# Patient Record
Sex: FEMALE | Race: WHITE | NOT HISPANIC OR LATINO
[De-identification: names, ages, dates, MRNs, and addresses within clinical notes are randomized per-mention and may not be internally consistent; named-entity substitution may affect disease eponyms.]

---

## 2017-03-02 ENCOUNTER — RESULT REVIEW (OUTPATIENT)
Age: 42
End: 2017-03-02

## 2018-03-06 ENCOUNTER — RESULT REVIEW (OUTPATIENT)
Age: 43
End: 2018-03-06

## 2018-04-25 ENCOUNTER — TRANSCRIPTION ENCOUNTER (OUTPATIENT)
Age: 43
End: 2018-04-25

## 2019-03-27 ENCOUNTER — RESULT REVIEW (OUTPATIENT)
Age: 44
End: 2019-03-27

## 2019-05-10 ENCOUNTER — APPOINTMENT (OUTPATIENT)
Dept: SURGICAL ONCOLOGY | Facility: CLINIC | Age: 44
End: 2019-05-10
Payer: COMMERCIAL

## 2019-05-10 VITALS
BODY MASS INDEX: 30.9 KG/M2 | WEIGHT: 181 LBS | TEMPERATURE: 98.2 F | RESPIRATION RATE: 17 BRPM | HEIGHT: 64 IN | HEART RATE: 78 BPM | DIASTOLIC BLOOD PRESSURE: 86 MMHG | OXYGEN SATURATION: 98 % | SYSTOLIC BLOOD PRESSURE: 144 MMHG

## 2019-05-10 DIAGNOSIS — E07.9 DISORDER OF THYROID, UNSPECIFIED: ICD-10-CM

## 2019-05-10 DIAGNOSIS — Z87.01 PERSONAL HISTORY OF PNEUMONIA (RECURRENT): ICD-10-CM

## 2019-05-10 DIAGNOSIS — Z80.3 FAMILY HISTORY OF MALIGNANT NEOPLASM OF BREAST: ICD-10-CM

## 2019-05-10 DIAGNOSIS — Z78.9 OTHER SPECIFIED HEALTH STATUS: ICD-10-CM

## 2019-05-10 DIAGNOSIS — N60.19 DIFFUSE CYSTIC MASTOPATHY OF UNSPECIFIED BREAST: ICD-10-CM

## 2019-05-10 PROCEDURE — 99245 OFF/OP CONSLTJ NEW/EST HI 55: CPT

## 2019-05-10 PROCEDURE — 99243 OFF/OP CNSLTJ NEW/EST LOW 30: CPT

## 2019-05-10 NOTE — ASSESSMENT
[FreeTextEntry1] : Left breast lump likely fibroglandular dense breast tissue \par BIRADS-3 breast ultrasound \par Reassured patient that index of suspicion for malignancy is low. \par Lifetime breast cancer risk calculated at 15.9% using Maria Isabel model.\par Will get 6 month follow up left breast ultrasound\par RTO 6 months

## 2019-05-10 NOTE — CONSULT LETTER
[Consult Letter:] : I had the pleasure of evaluating your patient, [unfilled]. [Dear  ___] : Dear  [unfilled], [Sincerely,] : Sincerely, [Please see my note below.] : Please see my note below. [FreeTextEntry3] : Nahum Shelby MD FACS

## 2019-05-10 NOTE — PHYSICAL EXAM
[Normal] : supple, no neck mass and thyroid not enlarged [Normal Supraclavicular Lymph Nodes] : normal supraclavicular lymph nodes [Normal Groin Lymph Nodes] : normal groin lymph nodes [Normal Neck Lymph Nodes] : normal neck lymph nodes  [Normal Axillary Lymph Nodes] : normal axillary lymph nodes [Normal] : oriented to person, place and time, with appropriate affect [de-identified] : No masses or adenopathy bilaterally, mild tender nodularity left 12-2:00 retroareolar location

## 2019-05-10 NOTE — HISTORY OF PRESENT ILLNESS
[de-identified] : 43 y/o female presents for initial consultation for a left breast lump that was noted by Dr. Serrano. \par She also reports intermittent breast pain that may correlate to her menstrual period. \par \par Bilateral MMG 3/28/19: Dense breasts, no evidence of malignancy. BIRADS-2. \par Bilateral breast US 3/28/19: Stable 3 mm fibrocystic nodule seen in right breast. No suspicious finding in left breast. BIRADS-3. \par \par Denies any previous breast biopsies. \par She reports recent weight gain.  \par Denies nipple discharge, nipple retraction/inversion, or skin changes. \par Denies constitutional symptoms. \par Denies fever or chills. \par FMHx: mother (BRCA negative) had breast cancer\par Lifetime breast cancer risk calculated at 15.9% using Maria Isabel model.

## 2019-05-10 NOTE — OB HISTORY
[Menstruating] : The patient is menstruating [Menarche Age ____] : menarche age [unfilled] [Definite ___ (Date)] : the last menstrual period was [unfilled]

## 2019-05-10 NOTE — ADDENDUM
[FreeTextEntry1] : I, Antonieta Lees, acted solely as a scribe for Dr. Nahum Shelby on this date 5/10/19.

## 2020-06-05 ENCOUNTER — RESULT REVIEW (OUTPATIENT)
Age: 45
End: 2020-06-05

## 2021-02-11 ENCOUNTER — APPOINTMENT (OUTPATIENT)
Dept: ORTHOPEDIC SURGERY | Facility: CLINIC | Age: 46
End: 2021-02-11
Payer: COMMERCIAL

## 2021-02-11 VITALS — TEMPERATURE: 97.3 F

## 2021-02-11 VITALS
SYSTOLIC BLOOD PRESSURE: 167 MMHG | DIASTOLIC BLOOD PRESSURE: 84 MMHG | HEART RATE: 96 BPM | HEIGHT: 64 IN | BODY MASS INDEX: 30.9 KG/M2 | WEIGHT: 181 LBS

## 2021-02-11 DIAGNOSIS — M75.41 IMPINGEMENT SYNDROME OF RIGHT SHOULDER: ICD-10-CM

## 2021-02-11 PROCEDURE — 99072 ADDL SUPL MATRL&STAF TM PHE: CPT

## 2021-02-11 PROCEDURE — 73030 X-RAY EXAM OF SHOULDER: CPT | Mod: RT

## 2021-02-11 PROCEDURE — 99203 OFFICE O/P NEW LOW 30 MIN: CPT

## 2021-02-11 RX ORDER — ROSUVASTATIN CALCIUM 10 MG/1
10 TABLET, FILM COATED ORAL
Refills: 0 | Status: ACTIVE | COMMUNITY

## 2021-02-11 RX ORDER — LEVOTHYROXINE SODIUM 137 UG/1
TABLET ORAL
Refills: 0 | Status: ACTIVE | COMMUNITY

## 2021-03-18 ENCOUNTER — APPOINTMENT (OUTPATIENT)
Dept: ORTHOPEDIC SURGERY | Facility: CLINIC | Age: 46
End: 2021-03-18
Payer: COMMERCIAL

## 2021-03-18 DIAGNOSIS — M67.911 UNSPECIFIED DISORDER OF SYNOVIUM AND TENDON, RIGHT SHOULDER: ICD-10-CM

## 2021-03-18 PROCEDURE — 99072 ADDL SUPL MATRL&STAF TM PHE: CPT

## 2021-03-18 PROCEDURE — 99213 OFFICE O/P EST LOW 20 MIN: CPT

## 2021-04-01 ENCOUNTER — TRANSCRIPTION ENCOUNTER (OUTPATIENT)
Age: 46
End: 2021-04-01

## 2021-06-08 ENCOUNTER — RESULT REVIEW (OUTPATIENT)
Age: 46
End: 2021-06-08

## 2021-07-14 ENCOUNTER — NON-APPOINTMENT (OUTPATIENT)
Age: 46
End: 2021-07-14

## 2021-07-30 ENCOUNTER — APPOINTMENT (OUTPATIENT)
Dept: ORTHOPEDIC SURGERY | Facility: CLINIC | Age: 46
End: 2021-07-30
Payer: COMMERCIAL

## 2021-07-30 VITALS
HEART RATE: 85 BPM | HEIGHT: 64 IN | WEIGHT: 181 LBS | BODY MASS INDEX: 30.9 KG/M2 | SYSTOLIC BLOOD PRESSURE: 134 MMHG | DIASTOLIC BLOOD PRESSURE: 88 MMHG

## 2021-07-30 DIAGNOSIS — M25.511 PAIN IN RIGHT SHOULDER: ICD-10-CM

## 2021-07-30 DIAGNOSIS — M75.21 BICIPITAL TENDINITIS, RIGHT SHOULDER: ICD-10-CM

## 2021-07-30 DIAGNOSIS — M67.911 UNSPECIFIED DISORDER OF SYNOVIUM AND TENDON, RIGHT SHOULDER: ICD-10-CM

## 2021-07-30 DIAGNOSIS — G89.29 PAIN IN RIGHT SHOULDER: ICD-10-CM

## 2021-07-30 PROCEDURE — 99213 OFFICE O/P EST LOW 20 MIN: CPT

## 2021-08-18 ENCOUNTER — RESULT REVIEW (OUTPATIENT)
Age: 46
End: 2021-08-18

## 2021-09-10 ENCOUNTER — OUTPATIENT (OUTPATIENT)
Dept: OUTPATIENT SERVICES | Facility: HOSPITAL | Age: 46
LOS: 1 days | End: 2021-09-10
Payer: COMMERCIAL

## 2021-09-10 VITALS
HEART RATE: 77 BPM | OXYGEN SATURATION: 98 % | HEIGHT: 64 IN | WEIGHT: 195.11 LBS | RESPIRATION RATE: 16 BRPM | DIASTOLIC BLOOD PRESSURE: 78 MMHG | TEMPERATURE: 98 F | SYSTOLIC BLOOD PRESSURE: 128 MMHG

## 2021-09-10 DIAGNOSIS — N84.0 POLYP OF CORPUS UTERI: ICD-10-CM

## 2021-09-10 DIAGNOSIS — I10 ESSENTIAL (PRIMARY) HYPERTENSION: ICD-10-CM

## 2021-09-10 DIAGNOSIS — Z98.891 HISTORY OF UTERINE SCAR FROM PREVIOUS SURGERY: Chronic | ICD-10-CM

## 2021-09-10 DIAGNOSIS — Z01.818 ENCOUNTER FOR OTHER PREPROCEDURAL EXAMINATION: ICD-10-CM

## 2021-09-10 LAB
ANION GAP SERPL CALC-SCNC: 14 MMOL/L — SIGNIFICANT CHANGE UP (ref 5–17)
BLD GP AB SCN SERPL QL: NEGATIVE — SIGNIFICANT CHANGE UP
BUN SERPL-MCNC: 12 MG/DL — SIGNIFICANT CHANGE UP (ref 7–23)
CALCIUM SERPL-MCNC: 9.5 MG/DL — SIGNIFICANT CHANGE UP (ref 8.4–10.5)
CHLORIDE SERPL-SCNC: 103 MMOL/L — SIGNIFICANT CHANGE UP (ref 96–108)
CO2 SERPL-SCNC: 21 MMOL/L — LOW (ref 22–31)
CREAT SERPL-MCNC: 0.64 MG/DL — SIGNIFICANT CHANGE UP (ref 0.5–1.3)
GLUCOSE SERPL-MCNC: 110 MG/DL — HIGH (ref 70–99)
HCT VFR BLD CALC: 41.1 % — SIGNIFICANT CHANGE UP (ref 34.5–45)
HGB BLD-MCNC: 13.5 G/DL — SIGNIFICANT CHANGE UP (ref 11.5–15.5)
MCHC RBC-ENTMCNC: 28.5 PG — SIGNIFICANT CHANGE UP (ref 27–34)
MCHC RBC-ENTMCNC: 32.8 GM/DL — SIGNIFICANT CHANGE UP (ref 32–36)
MCV RBC AUTO: 86.7 FL — SIGNIFICANT CHANGE UP (ref 80–100)
NRBC # BLD: 0 /100 WBCS — SIGNIFICANT CHANGE UP (ref 0–0)
PLATELET # BLD AUTO: 339 K/UL — SIGNIFICANT CHANGE UP (ref 150–400)
POTASSIUM SERPL-MCNC: 3.4 MMOL/L — LOW (ref 3.5–5.3)
POTASSIUM SERPL-SCNC: 3.4 MMOL/L — LOW (ref 3.5–5.3)
RBC # BLD: 4.74 M/UL — SIGNIFICANT CHANGE UP (ref 3.8–5.2)
RBC # FLD: 12.9 % — SIGNIFICANT CHANGE UP (ref 10.3–14.5)
RH IG SCN BLD-IMP: POSITIVE — SIGNIFICANT CHANGE UP
SODIUM SERPL-SCNC: 138 MMOL/L — SIGNIFICANT CHANGE UP (ref 135–145)
WBC # BLD: 9.61 K/UL — SIGNIFICANT CHANGE UP (ref 3.8–10.5)
WBC # FLD AUTO: 9.61 K/UL — SIGNIFICANT CHANGE UP (ref 3.8–10.5)

## 2021-09-10 PROCEDURE — 86900 BLOOD TYPING SEROLOGIC ABO: CPT

## 2021-09-10 PROCEDURE — G0463: CPT

## 2021-09-10 PROCEDURE — 80048 BASIC METABOLIC PNL TOTAL CA: CPT

## 2021-09-10 PROCEDURE — 86901 BLOOD TYPING SEROLOGIC RH(D): CPT

## 2021-09-10 PROCEDURE — 86850 RBC ANTIBODY SCREEN: CPT

## 2021-09-10 PROCEDURE — 85027 COMPLETE CBC AUTOMATED: CPT

## 2021-09-10 RX ORDER — SODIUM CHLORIDE 9 MG/ML
3 INJECTION INTRAMUSCULAR; INTRAVENOUS; SUBCUTANEOUS EVERY 8 HOURS
Refills: 0 | Status: DISCONTINUED | OUTPATIENT
Start: 2021-09-23 | End: 2021-10-07

## 2021-09-10 RX ORDER — LIDOCAINE HCL 20 MG/ML
0.2 VIAL (ML) INJECTION ONCE
Refills: 0 | Status: DISCONTINUED | OUTPATIENT
Start: 2021-09-23 | End: 2021-10-07

## 2021-09-10 NOTE — H&P PST ADULT - NS HIV RISK FACTOR
Diabetic screening  Has been a diabetic for 8 years   Takes metformin regularly as scheduled.  A1c is 7.2  Checks blood sugar but not regularly.   Following diet no concerns at this time.   No

## 2021-09-10 NOTE — H&P PST ADULT - HISTORY OF PRESENT ILLNESS
47 y/o female with h/o HTN, HLD, seasonal Asthma c/o menorrhagia with irregular cycles > 6 month, imaging studies done and found to have uterine polyps. Today she presents to PST for scheduled D&C Diagnostic Hysteroscopy Possible Polypectomy Possible Myosure on 9/23/21. Denies any palpitations, SOB, N/V, fever or chills.    ***COVID swab scheduled for 9/20/21***

## 2021-09-10 NOTE — H&P PST ADULT - ATTENDING COMMENTS
Patient presented with complaints of irregular periods, occasional dysmenorrhea, and occasional menorrhagia. An ultrasound and then SHG were performed revealing a possible polypoid growth in the endometrial cavity.  She presents for definitive treatment with D&C hysteroscopy possible polypectomy possible myosure.

## 2021-09-10 NOTE — H&P PST ADULT - NSICDXPASTMEDICALHX_GEN_ALL_CORE_FT
PAST MEDICAL HISTORY:  Disorder of right rotator cuff     History of fibrocystic disease of breast     HLD (hyperlipidemia)     Hypothyroid     Labral tear of shoulder, right, initial encounter     Menorrhagia with irregular cycle     Seasonal asthma     Thyroid nodule     Uterine polyp

## 2021-09-10 NOTE — H&P PST ADULT - PROBLEM SELECTOR PLAN 1
D&C Diagnostic Hysteroscopy Possible Polypectomy Possible Myosure on 9/23/21.  Pre-op education provided - all questions answered. Pt verbalized understanding

## 2021-09-20 PROBLEM — S43.431A SUPERIOR GLENOID LABRUM LESION OF RIGHT SHOULDER, INITIAL ENCOUNTER: Chronic | Status: ACTIVE | Noted: 2021-09-10

## 2021-09-20 PROBLEM — E04.1 NONTOXIC SINGLE THYROID NODULE: Chronic | Status: ACTIVE | Noted: 2021-09-10

## 2021-09-20 PROBLEM — M67.911 UNSPECIFIED DISORDER OF SYNOVIUM AND TENDON, RIGHT SHOULDER: Chronic | Status: ACTIVE | Noted: 2021-09-10

## 2021-09-20 PROBLEM — E78.5 HYPERLIPIDEMIA, UNSPECIFIED: Chronic | Status: ACTIVE | Noted: 2021-09-10

## 2021-09-20 PROBLEM — E03.9 HYPOTHYROIDISM, UNSPECIFIED: Chronic | Status: ACTIVE | Noted: 2021-09-10

## 2021-09-20 PROBLEM — N92.1 EXCESSIVE AND FREQUENT MENSTRUATION WITH IRREGULAR CYCLE: Chronic | Status: ACTIVE | Noted: 2021-09-10

## 2021-09-20 PROBLEM — N84.0 POLYP OF CORPUS UTERI: Chronic | Status: ACTIVE | Noted: 2021-09-10

## 2021-09-20 PROBLEM — J45.998 OTHER ASTHMA: Chronic | Status: ACTIVE | Noted: 2021-09-10

## 2021-09-20 PROBLEM — Z87.898 PERSONAL HISTORY OF OTHER SPECIFIED CONDITIONS: Chronic | Status: ACTIVE | Noted: 2021-09-10

## 2021-09-21 ENCOUNTER — OUTPATIENT (OUTPATIENT)
Dept: OUTPATIENT SERVICES | Facility: HOSPITAL | Age: 46
LOS: 1 days | End: 2021-09-21
Payer: COMMERCIAL

## 2021-09-21 DIAGNOSIS — Z11.52 ENCOUNTER FOR SCREENING FOR COVID-19: ICD-10-CM

## 2021-09-21 DIAGNOSIS — Z98.891 HISTORY OF UTERINE SCAR FROM PREVIOUS SURGERY: Chronic | ICD-10-CM

## 2021-09-21 LAB — SARS-COV-2 RNA SPEC QL NAA+PROBE: SIGNIFICANT CHANGE UP

## 2021-09-21 PROCEDURE — U0003: CPT

## 2021-09-21 PROCEDURE — C9803: CPT

## 2021-09-21 PROCEDURE — U0005: CPT

## 2021-09-22 ENCOUNTER — TRANSCRIPTION ENCOUNTER (OUTPATIENT)
Age: 46
End: 2021-09-22

## 2021-09-23 ENCOUNTER — OUTPATIENT (OUTPATIENT)
Dept: OUTPATIENT SERVICES | Facility: HOSPITAL | Age: 46
LOS: 1 days | End: 2021-09-23
Payer: COMMERCIAL

## 2021-09-23 ENCOUNTER — RESULT REVIEW (OUTPATIENT)
Age: 46
End: 2021-09-23

## 2021-09-23 VITALS
RESPIRATION RATE: 16 BRPM | HEIGHT: 64 IN | DIASTOLIC BLOOD PRESSURE: 78 MMHG | OXYGEN SATURATION: 98 % | TEMPERATURE: 98 F | WEIGHT: 195.11 LBS | HEART RATE: 86 BPM | SYSTOLIC BLOOD PRESSURE: 129 MMHG

## 2021-09-23 VITALS
DIASTOLIC BLOOD PRESSURE: 78 MMHG | SYSTOLIC BLOOD PRESSURE: 128 MMHG | HEART RATE: 80 BPM | TEMPERATURE: 98 F | RESPIRATION RATE: 16 BRPM | OXYGEN SATURATION: 100 %

## 2021-09-23 DIAGNOSIS — Z98.891 HISTORY OF UTERINE SCAR FROM PREVIOUS SURGERY: Chronic | ICD-10-CM

## 2021-09-23 DIAGNOSIS — N84.0 POLYP OF CORPUS UTERI: ICD-10-CM

## 2021-09-23 LAB — RH IG SCN BLD-IMP: POSITIVE — SIGNIFICANT CHANGE UP

## 2021-09-23 PROCEDURE — 88305 TISSUE EXAM BY PATHOLOGIST: CPT | Mod: 26

## 2021-09-23 PROCEDURE — 88305 TISSUE EXAM BY PATHOLOGIST: CPT

## 2021-09-23 PROCEDURE — 58558 HYSTEROSCOPY BIOPSY: CPT

## 2021-09-23 RX ORDER — FENTANYL CITRATE 50 UG/ML
25 INJECTION INTRAVENOUS
Refills: 0 | Status: DISCONTINUED | OUTPATIENT
Start: 2021-09-23 | End: 2021-09-23

## 2021-09-23 RX ORDER — OXYCODONE HYDROCHLORIDE 5 MG/1
5 TABLET ORAL ONCE
Refills: 0 | Status: DISCONTINUED | OUTPATIENT
Start: 2021-09-23 | End: 2021-09-23

## 2021-09-23 RX ORDER — ONDANSETRON 8 MG/1
4 TABLET, FILM COATED ORAL ONCE
Refills: 0 | Status: DISCONTINUED | OUTPATIENT
Start: 2021-09-23 | End: 2021-10-07

## 2021-09-23 RX ADMIN — SODIUM CHLORIDE 3 MILLILITER(S): 9 INJECTION INTRAMUSCULAR; INTRAVENOUS; SUBCUTANEOUS at 06:32

## 2021-09-23 NOTE — BRIEF OPERATIVE NOTE - NSICDXBRIEFPOSTOP_GEN_ALL_CORE_FT
POST-OP DIAGNOSIS:  Abnormal uterine bleeding (AUB) 23-Sep-2021 07:52:10  Angela Serrano  Endometrial polyp 23-Sep-2021 07:52:18  Angela Serrano

## 2021-09-23 NOTE — BRIEF OPERATIVE NOTE - NSICDXBRIEFPROCEDURE_GEN_ALL_CORE_FT
PROCEDURES:  Hysteroscopy with dilation and curettage of uterus 23-Sep-2021 07:51:35  Angela Serrano  Uterine polypectomy 23-Sep-2021 07:51:43  Angela Serrano

## 2021-09-23 NOTE — ASU DISCHARGE PLAN (ADULT/PEDIATRIC) - CARE PROVIDER_API CALL
Angela Serrano)  Obstetrics and Gynecology  [address], [address]  [place] [address]  Phone: [phone]  Fax: [phone]  Follow Up Time:

## 2021-09-23 NOTE — PRE-ANESTHESIA EVALUATION ADULT - NSDENTALSD_ENT_ALL_CORE
Anesthesia Evaluation     Patient summary reviewed and Nursing notes reviewed   history of anesthetic complications: PONV  NPO Solid Status: > 8 hours  NPO Liquid Status: > 8 hours           Airway   Mallampati: II  TM distance: >3 FB  Neck ROM: full  No difficulty expected  Dental      Pulmonary - normal exam   Cardiovascular - negative cardio ROS and normal exam        Neuro/Psych  (+) headaches,     GI/Hepatic/Renal/Endo      Musculoskeletal     (+) neck pain,   Abdominal    Substance History      OB/GYN          Other   arthritis,                      Anesthesia Plan    ASA 2     spinal     intravenous induction     Anesthetic plan, all risks, benefits, and alternatives have been provided, discussed and informed consent has been obtained with: patient.    Plan discussed with CRNA.      
appears normal and intact

## 2021-09-23 NOTE — ASU DISCHARGE PLAN (ADULT/PEDIATRIC) - ACTIVITY LEVEL
No excercise/No heavy lifting/No sports/gym/Nothing per vagina/No tub baths/No douching/No tampons/No intercourse

## 2021-09-28 LAB — SURGICAL PATHOLOGY STUDY: SIGNIFICANT CHANGE UP

## 2021-11-23 ENCOUNTER — TRANSCRIPTION ENCOUNTER (OUTPATIENT)
Age: 46
End: 2021-11-23

## 2021-11-26 ENCOUNTER — OUTPATIENT (OUTPATIENT)
Dept: OUTPATIENT SERVICES | Facility: HOSPITAL | Age: 46
LOS: 1 days | End: 2021-11-26
Payer: COMMERCIAL

## 2021-11-26 VITALS
HEIGHT: 64 IN | HEART RATE: 94 BPM | RESPIRATION RATE: 14 BRPM | DIASTOLIC BLOOD PRESSURE: 92 MMHG | TEMPERATURE: 98 F | OXYGEN SATURATION: 97 % | SYSTOLIC BLOOD PRESSURE: 127 MMHG | WEIGHT: 195.11 LBS

## 2021-11-26 DIAGNOSIS — M75.01 ADHESIVE CAPSULITIS OF RIGHT SHOULDER: ICD-10-CM

## 2021-11-26 DIAGNOSIS — Z90.89 ACQUIRED ABSENCE OF OTHER ORGANS: Chronic | ICD-10-CM

## 2021-11-26 DIAGNOSIS — Z98.891 HISTORY OF UTERINE SCAR FROM PREVIOUS SURGERY: Chronic | ICD-10-CM

## 2021-11-26 DIAGNOSIS — Z98.890 OTHER SPECIFIED POSTPROCEDURAL STATES: Chronic | ICD-10-CM

## 2021-11-26 DIAGNOSIS — Z91.89 OTHER SPECIFIED PERSONAL RISK FACTORS, NOT ELSEWHERE CLASSIFIED: ICD-10-CM

## 2021-11-26 DIAGNOSIS — E03.9 HYPOTHYROIDISM, UNSPECIFIED: ICD-10-CM

## 2021-11-26 DIAGNOSIS — I10 ESSENTIAL (PRIMARY) HYPERTENSION: ICD-10-CM

## 2021-11-26 LAB
ANION GAP SERPL CALC-SCNC: 14 MMOL/L — SIGNIFICANT CHANGE UP (ref 7–14)
BUN SERPL-MCNC: 16 MG/DL — SIGNIFICANT CHANGE UP (ref 7–23)
CALCIUM SERPL-MCNC: 9.5 MG/DL — SIGNIFICANT CHANGE UP (ref 8.4–10.5)
CHLORIDE SERPL-SCNC: 102 MMOL/L — SIGNIFICANT CHANGE UP (ref 98–107)
CO2 SERPL-SCNC: 23 MMOL/L — SIGNIFICANT CHANGE UP (ref 22–31)
CREAT SERPL-MCNC: 0.52 MG/DL — SIGNIFICANT CHANGE UP (ref 0.5–1.3)
GLUCOSE SERPL-MCNC: 87 MG/DL — SIGNIFICANT CHANGE UP (ref 70–99)
HCG UR QL: NEGATIVE — SIGNIFICANT CHANGE UP
HCT VFR BLD CALC: 43.9 % — SIGNIFICANT CHANGE UP (ref 34.5–45)
HGB BLD-MCNC: 13.7 G/DL — SIGNIFICANT CHANGE UP (ref 11.5–15.5)
MCHC RBC-ENTMCNC: 28.7 PG — SIGNIFICANT CHANGE UP (ref 27–34)
MCHC RBC-ENTMCNC: 31.2 GM/DL — LOW (ref 32–36)
MCV RBC AUTO: 91.8 FL — SIGNIFICANT CHANGE UP (ref 80–100)
NRBC # BLD: 0 /100 WBCS — SIGNIFICANT CHANGE UP
NRBC # FLD: 0 K/UL — SIGNIFICANT CHANGE UP
PLATELET # BLD AUTO: 382 K/UL — SIGNIFICANT CHANGE UP (ref 150–400)
POTASSIUM SERPL-MCNC: 3.5 MMOL/L — SIGNIFICANT CHANGE UP (ref 3.5–5.3)
POTASSIUM SERPL-SCNC: 3.5 MMOL/L — SIGNIFICANT CHANGE UP (ref 3.5–5.3)
RBC # BLD: 4.78 M/UL — SIGNIFICANT CHANGE UP (ref 3.8–5.2)
RBC # FLD: 12.9 % — SIGNIFICANT CHANGE UP (ref 10.3–14.5)
SODIUM SERPL-SCNC: 139 MMOL/L — SIGNIFICANT CHANGE UP (ref 135–145)
WBC # BLD: 10.46 K/UL — SIGNIFICANT CHANGE UP (ref 3.8–10.5)
WBC # FLD AUTO: 10.46 K/UL — SIGNIFICANT CHANGE UP (ref 3.8–10.5)

## 2021-11-26 PROCEDURE — 93010 ELECTROCARDIOGRAM REPORT: CPT

## 2021-11-26 RX ORDER — OMEGA-3 ACID ETHYL ESTERS 1 G
1 CAPSULE ORAL
Qty: 0 | Refills: 0 | DISCHARGE

## 2021-11-26 NOTE — H&P PST ADULT - NEUROLOGICAL DETAILS
alert and oriented x 3/responds to verbal commands/no spontaneous movement/normal strength alert and oriented x 3/responds to verbal commands

## 2021-11-26 NOTE — H&P PST ADULT - HISTORY OF PRESENT ILLNESS
45 y/o female with h/o HTN, HLD, seasonal Asthma presents with preop dx adhesive capsulitis of right shoulder scheduled for right shoulder arthroscopy debridement, lysis of adhesions manipulation injection with exparel. Patient reports injuring her arm 3/2020 while lifting sofa, has tried injections, P.T. with no improvement, imaging noted labrum tear, adhesive capsulitis, and developed frozen shoulder. Patient has limited ROM moving right arm behind back

## 2021-11-26 NOTE — H&P PST ADULT - NSICDXPASTSURGICALHX_GEN_ALL_CORE_FT
PAST SURGICAL HISTORY:  H/O:  x2    S/P dilation and curettage     S/P tendon repair     S/P tonsillectomy

## 2021-11-26 NOTE — H&P PST ADULT - NSICDXPASTMEDICALHX_GEN_ALL_CORE_FT
PAST MEDICAL HISTORY:  Adhesive capsulitis of right shoulder     Disorder of right rotator cuff     History of fibrocystic disease of breast     HLD (hyperlipidemia)     HTN (hypertension)     Hypothyroid     Labral tear of shoulder, right, initial encounter     Menorrhagia with irregular cycle     Seasonal asthma     Thyroid nodule     Uterine polyp      PAST MEDICAL HISTORY:  Adhesive capsulitis of right shoulder     Asthma     Disorder of right rotator cuff     History of fibrocystic disease of breast     HLD (hyperlipidemia)     HTN (hypertension)     Hypothyroid     Labral tear of shoulder, right, initial encounter     Menorrhagia with irregular cycle     Seasonal asthma     Thyroid nodule     Uterine polyp

## 2021-11-26 NOTE — H&P PST ADULT - PROBLEM SELECTOR PLAN 3
Assessment and Plan: Patient instructed to take amlodipine on day of procedure, verbalized understanding.

## 2021-11-26 NOTE — H&P PST ADULT - MUSCULOSKELETAL COMMENTS
right shoulder pain h/o Disorder of right rotator cuff right shoulder pain preop dx adhesive capsulitis of right shoulder

## 2021-11-26 NOTE — H&P PST ADULT - MUSCULOSKELETAL
detailed exam right shoulder/no joint swelling/no joint erythema/decreased ROM due to pain details… right shoulder/no joint swelling/no joint erythema/decreased ROM due to pain/diminished strength

## 2021-11-26 NOTE — H&P PST ADULT - PROBLEM SELECTOR PLAN 2
continue on antihypertensive medications Problem: Hypothyroidism  Plan: Instructed to take levothyroxine as prescribed, verbalized understanding

## 2021-11-26 NOTE — H&P PST ADULT - PROBLEM SELECTOR PLAN 1
D&C Diagnostic Hysteroscopy Possible Polypectomy Possible Myosure on 9/23/21.  Pre-op education provided - all questions answered. Pt verbalized understanding Assessment and Plan: Patient scheduled for surgery on 12/14/21  Patient provided with verbal and written presurgical instructions; verbalized understanding  with teach back.    Patient provided with famotidine for GI prophylaxis; verbalized understanding.    Patient provided with Chlorhexidine wash, verbal and written instructions reviewed. Patient demonstrated understanding with teach back.   Patient instructed to call surgeon to schedule COVID appointment     Echo requested    Problem: Pre-menopausal   Urine specimen cup provided     Patient instructed to stop tumeric and fishoil on 12/6/21

## 2021-11-26 NOTE — H&P PST ADULT - RS GEN PE MLT RESP DETAILS PC
breath sounds equal/good air movement/respirations non-labored/clear to auscultation bilaterally/no rales/no wheezes breath sounds equal/good air movement/respirations non-labored/clear to auscultation bilaterally/no rales/no rhonchi/no wheezes

## 2021-11-26 NOTE — H&P PST ADULT - OTHER CARE PROVIDERS
[phone] [phone] cardiologist

## 2021-11-26 NOTE — H&P PST ADULT - NSICDXFAMILYHX_GEN_ALL_CORE_FT
FAMILY HISTORY:  Father  Still living? Yes, Estimated age: Age Unknown  Family history of DVT, Age at diagnosis: Age Unknown  FH: heart disease, Age at diagnosis: Age Unknown    Mother  Still living? Yes, Estimated age: Age Unknown  FH: breast cancer, Age at diagnosis: Age Unknown

## 2021-11-26 NOTE — H&P PST ADULT - MS GEN HX ROS MEA POS PC
joint pain/muscle weakness/stiffness Dx  adhesive capsulitis of right shoulder/joint pain/muscle weakness/stiffness Dx  adhesive capsulitis of right shoulder/joint pain/muscle weakness/stiffness/arm pain R

## 2021-12-13 ENCOUNTER — TRANSCRIPTION ENCOUNTER (OUTPATIENT)
Age: 46
End: 2021-12-13

## 2021-12-13 VITALS
HEART RATE: 89 BPM | HEIGHT: 64 IN | OXYGEN SATURATION: 99 % | DIASTOLIC BLOOD PRESSURE: 67 MMHG | SYSTOLIC BLOOD PRESSURE: 156 MMHG | TEMPERATURE: 98 F | RESPIRATION RATE: 16 BRPM | WEIGHT: 195.11 LBS

## 2021-12-13 NOTE — ASU PREOPERATIVE ASSESSMENT, ADULT ([facility] ONLY) - FALL HARM RISK - UNIVERSAL INTERVENTIONS
Bed in lowest position, wheels locked, appropriate side rails in place/Call bell, personal items and telephone in reach/Instruct patient to call for assistance before getting out of bed or chair/Non-slip footwear when patient is out of bed/[place] to call system/Physically safe environment - no spills, clutter or unnecessary equipment/Purposeful Proactive Rounding/Room/bathroom lighting operational, light cord in reach

## 2021-12-14 ENCOUNTER — RESULT REVIEW (OUTPATIENT)
Age: 46
End: 2021-12-14

## 2021-12-14 ENCOUNTER — OUTPATIENT (OUTPATIENT)
Dept: OUTPATIENT SERVICES | Facility: HOSPITAL | Age: 46
LOS: 1 days | Discharge: HOME | End: 2021-12-14
Payer: COMMERCIAL

## 2021-12-14 VITALS
OXYGEN SATURATION: 99 % | TEMPERATURE: 98 F | DIASTOLIC BLOOD PRESSURE: 67 MMHG | SYSTOLIC BLOOD PRESSURE: 117 MMHG | HEART RATE: 93 BPM

## 2021-12-14 DIAGNOSIS — Z90.89 ACQUIRED ABSENCE OF OTHER ORGANS: Chronic | ICD-10-CM

## 2021-12-14 DIAGNOSIS — Z98.891 HISTORY OF UTERINE SCAR FROM PREVIOUS SURGERY: Chronic | ICD-10-CM

## 2021-12-14 DIAGNOSIS — Z98.890 OTHER SPECIFIED POSTPROCEDURAL STATES: Chronic | ICD-10-CM

## 2021-12-14 DIAGNOSIS — M75.01 ADHESIVE CAPSULITIS OF RIGHT SHOULDER: ICD-10-CM

## 2021-12-14 PROCEDURE — 88304 TISSUE EXAM BY PATHOLOGIST: CPT | Mod: 26

## 2021-12-14 NOTE — BRIEF OPERATIVE NOTE - NSICDXBRIEFPROCEDURE_GEN_ALL_CORE_FT
PROCEDURES:  Arthroscopy of shoulder with capsular release 14-Dec-2021 10:11:08  Efren Reagan  Arthroscopic lysis and resection of adhesions of shoulder with manipulation 14-Dec-2021 10:12:53  Efren Reagan  Injection of shoulder 14-Dec-2021 10:13:35  Efren Reagan

## 2021-12-14 NOTE — ASU DISCHARGE PLAN (ADULT/PEDIATRIC) - ASU DC SPECIAL INSTRUCTIONSFT
WOUND CARE:  Your dressing will be removed in the office. Keep dressing clean and dry.  BATHING: You may shower and/or sponge bathe. You may use warm soapy water in the shower and rinse, pat dry.  ACTIVITY: You are non-weight bearing of the right shoulder until you follow up. No heavy lifting or straining. Otherwise, you may return to your usual level of physical activity. If you are taking narcotic pain medication DO NOT drive a car, operate machinery or make important decisions.  DIET: Return to your usual diet.  NOTIFY YOUR SURGEON IF YOU HAVE: any bleeding that does not stop, any pus draining from your wound(s), any fever (over 101.4 F) persistent nausea/vomiting, if you are unable to urinate, or if your pain is not controlled on your discharge pain medications.    Please follow up with your surgeon, Dr. Ash in one week. WOUND CARE:  Your dressing will be removed in the office. Keep dressing clean and dry.  BATHING: You may shower and/or sponge bathe. You may use warm soapy water in the shower and rinse, pat dry.  ACTIVITY: You are weight bearing as tolerated through the right shoulder. No heavy lifting or straining. Otherwise, you may return to your usual level of physical activity. If you are taking narcotic pain medication DO NOT drive a car, operate machinery or make important decisions.  DIET: Return to your usual diet.  NOTIFY YOUR SURGEON IF YOU HAVE: any bleeding that does not stop, any pus draining from your wound(s), any fever (over 101.4 F) persistent nausea/vomiting, if you are unable to urinate, or if your pain is not controlled on your discharge pain medications.    Please follow up with your surgeon, Dr. Ash in one week.

## 2021-12-14 NOTE — ASU DISCHARGE PLAN (ADULT/PEDIATRIC) - CALL YOUR DOCTOR IF YOU HAVE ANY OF THE FOLLOWING:
Pain not relieved by Medications/Fever greater than (need to indicate Fahrenheit or Celsius)/Numbness, tingling, color or temperature change to extremity Bleeding that does not stop/Pain not relieved by Medications/Fever greater than (need to indicate Fahrenheit or Celsius)/Numbness, tingling, color or temperature change to extremity/Nausea and vomiting that does not stop

## 2021-12-14 NOTE — ASU DISCHARGE PLAN (ADULT/PEDIATRIC) - PROCEDURE
Last seen: 2/13/19  Follow up appointment: 3/20/19  Last filled: adderall XR 30 mg and adderall 20 mg  2/13/19  This is a Dr Sb Braxton patient    Okay to refill? Please advise. Right shoulder arthroscopy, capsular release, lysis of adhesions, manipulation under anesthesia, and cortisone injection Right shoulder arthroscopy, debridement, lysis of adhesions, manipulation under anesthesia, and exparel injection

## 2021-12-14 NOTE — BRIEF OPERATIVE NOTE - OPERATION/FINDINGS
inferior labrum tear, thickening of shoulder capsule, global adhesions in shoulder capsule, mild inflammation of biceps tendon

## 2021-12-14 NOTE — ASU DISCHARGE PLAN (ADULT/PEDIATRIC) - FOLLOW UP APPOINTMENTS
[facility], [facility] ([facility])

## 2021-12-14 NOTE — ASU DISCHARGE PLAN (ADULT/PEDIATRIC) - CARE PROVIDER_API CALL
Judd Ash)  Orthopaedic Surgery  [address] [place] [id number]  Phone: [phone]  Fax: [phone]  Follow Up Time: 1 week

## 2021-12-28 LAB — SURGICAL PATHOLOGY STUDY: SIGNIFICANT CHANGE UP

## 2022-04-19 PROBLEM — M75.01 ADHESIVE CAPSULITIS OF RIGHT SHOULDER: Chronic | Status: ACTIVE | Noted: 2021-11-26

## 2022-04-19 PROBLEM — I10 ESSENTIAL (PRIMARY) HYPERTENSION: Chronic | Status: ACTIVE | Noted: 2021-11-26

## 2022-04-19 PROBLEM — J45.909 UNSPECIFIED ASTHMA, UNCOMPLICATED: Chronic | Status: ACTIVE | Noted: 2021-11-26

## 2022-04-29 ENCOUNTER — APPOINTMENT (OUTPATIENT)
Dept: ORTHOPEDIC SURGERY | Facility: CLINIC | Age: 47
End: 2022-04-29
Payer: COMMERCIAL

## 2022-04-29 VITALS — BODY MASS INDEX: 32.44 KG/M2 | HEIGHT: 64 IN | WEIGHT: 190 LBS

## 2022-04-29 DIAGNOSIS — G57.13 MERALGIA PARESTHETICA, BILATERAL LOWER LIMBS: ICD-10-CM

## 2022-04-29 DIAGNOSIS — E78.00 PURE HYPERCHOLESTEROLEMIA, UNSPECIFIED: ICD-10-CM

## 2022-04-29 DIAGNOSIS — M54.16 RADICULOPATHY, LUMBAR REGION: ICD-10-CM

## 2022-04-29 DIAGNOSIS — I10 ESSENTIAL (PRIMARY) HYPERTENSION: ICD-10-CM

## 2022-04-29 PROCEDURE — 99204 OFFICE O/P NEW MOD 45 MIN: CPT

## 2022-04-29 PROCEDURE — 72170 X-RAY EXAM OF PELVIS: CPT

## 2022-04-29 PROCEDURE — 72110 X-RAY EXAM L-2 SPINE 4/>VWS: CPT

## 2022-04-29 RX ORDER — GABAPENTIN 100 MG/1
100 CAPSULE ORAL
Qty: 60 | Refills: 1 | Status: ACTIVE | COMMUNITY
Start: 2022-04-29 | End: 1900-01-01

## 2022-04-29 NOTE — HISTORY OF PRESENT ILLNESS
[Gradual] : gradual [6] : 6 [0] : 0 [Burning] : burning [Tingling] : tingling [Constant] : constant [Household chores] : household chores [Rest] : rest [Standing] : standing [Walking] : walking [Full time] : Work status: full time [de-identified] : 4/29/22- 46 y/o f presents for occasional lower back pain with associated burning sensation/N/T in b/l anterior thighs to knees  X 6 months (radic more than back pain). Aggravated by prolonged standing. Alleviated by sitting. \par  [] : no [FreeTextEntry5] : pt is 47 years old female who presents evaluation of the lumber spine, pt states she has been experiencing burning sensation   on her chas thigh for 6 months now, pt states when she is standing or walking is when she has those burning sensation  [FreeTextEntry7] : lower back

## 2022-04-29 NOTE — ASSESSMENT
[FreeTextEntry1] : PT, meds\par follow up 6 weeks\par Will discuss MRI to eval for high lumbar HNP\par

## 2022-04-29 NOTE — IMAGING
[Disc space narrowing] : Disc space narrowing [de-identified] : \par Palpation: No tenderness to palpation or spasm in bilateral thoracic and lumbar paraspinal musculature, no SI joint tenderness to palpation\par ROM: Full with no pain\par Strength: 5/5 bilateral hip flexors, knee extensors, ankle dorsiflexors, EHL, ankle plantarflexors\par Sensation: Sensation present to light touch bilateral L2-S1 distributions\par Provocative maneuvers: Negative bilateral straight leg raise\par Negative Tinel's ASIS bilaterally\par \par Hips: No pain with b/l hip flex/IR/ER\par  [de-identified] : No abnormalities on pelvis XR

## 2022-05-02 ENCOUNTER — APPOINTMENT (OUTPATIENT)
Dept: ORTHOPEDIC SURGERY | Facility: CLINIC | Age: 47
End: 2022-05-02

## 2022-05-17 ENCOUNTER — APPOINTMENT (OUTPATIENT)
Dept: ORTHOPEDIC SURGERY | Facility: CLINIC | Age: 47
End: 2022-05-17
Payer: COMMERCIAL

## 2022-05-17 VITALS — WEIGHT: 190 LBS | HEIGHT: 64 IN | BODY MASS INDEX: 32.44 KG/M2

## 2022-05-17 DIAGNOSIS — S92.403A DISPLACED UNSPECIFIED FRACTURE OF UNSPECIFIED GREAT TOE, INITIAL ENCOUNTER FOR CLOSED FRACTURE: ICD-10-CM

## 2022-05-17 PROCEDURE — 73630 X-RAY EXAM OF FOOT: CPT | Mod: LT

## 2022-05-17 PROCEDURE — 99214 OFFICE O/P EST MOD 30 MIN: CPT

## 2022-05-25 PROBLEM — S92.403A FRACTURE OF GREAT TOE: Status: ACTIVE | Noted: 2022-05-25

## 2022-05-25 NOTE — HISTORY OF PRESENT ILLNESS
[9] : 9 [7] : 7 [Throbbing] : throbbing [de-identified] : 46 y/o F presenting with left big toe pain. Reports was walking the dog and stepped on leash and foot bent back.  [] : no [FreeTextEntry5] : pt got leg caught in dog leash

## 2022-05-25 NOTE — ASSESSMENT
[FreeTextEntry1] : 48 y/o F presenting with foot pain and swelling. XRAYs taken, no overt pathology, cannot rule out small fracture. Patient given boot and instructed to follow up with foot and ankle specialist.

## 2022-06-01 ENCOUNTER — APPOINTMENT (OUTPATIENT)
Dept: ORTHOPEDIC SURGERY | Facility: CLINIC | Age: 47
End: 2022-06-01

## 2022-06-07 ENCOUNTER — APPOINTMENT (OUTPATIENT)
Dept: ORTHOPEDIC SURGERY | Facility: CLINIC | Age: 47
End: 2022-06-07

## 2022-08-31 ENCOUNTER — APPOINTMENT (OUTPATIENT)
Dept: ORTHOPEDIC SURGERY | Facility: CLINIC | Age: 47
End: 2022-08-31

## 2022-08-31 VITALS — BODY MASS INDEX: 30.73 KG/M2 | WEIGHT: 180 LBS | HEIGHT: 64 IN

## 2022-08-31 DIAGNOSIS — Z00.00 ENCOUNTER FOR GENERAL ADULT MEDICAL EXAMINATION W/OUT ABNORMAL FINDINGS: ICD-10-CM

## 2022-08-31 PROCEDURE — 73630 X-RAY EXAM OF FOOT: CPT | Mod: LT

## 2022-08-31 PROCEDURE — 99214 OFFICE O/P EST MOD 30 MIN: CPT

## 2022-08-31 NOTE — ASSESSMENT
[FreeTextEntry1] : MRI left foot ordered to rule out a metatarsal stress fracture.\par Custom orthotics to be renewed.\par Further treatment pending mri results.\par

## 2022-08-31 NOTE — PHYSICAL EXAM
[Mild] : mild swelling of dorsal foot [2nd] : 2nd [3rd] : 3rd [NL (40)] : plantar flexion 40 degrees [NL 30)] : inversion 30 degrees [NL (20)] : eversion 20 degrees [5___] : [facility] 5[unfilled]/5 [2+] : posterior tibialis pulse: 2+ [Normal] : saphenous nerve sensation normal [] : patient ambulates without assistive device [Left] : left foot [Weight -] : weightbearing [There are no fractures, subluxations or dislocations. No significant abnormalities are seen] : There are no fractures, subluxations or dislocations. No significant abnormalities are seen [de-identified] : Healed great toe distal phalanx fracture.

## 2022-08-31 NOTE — HISTORY OF PRESENT ILLNESS
[Sudden] : sudden [0] : 0 [Sharp] : sharp [Occasional] : occasional [de-identified] : Patient is a 48 y/o F presenting with left big toe pain. Reports was walking her dog on May 17, 2022 and stepped on leash and foot bent back. Went to [facility] where XR showed a great toe fracture. She wore a cam boot for two weeks, but never followed up.  She continues to have pain in the dorsal forefoot. [] : Post Surgical Visit: no [FreeTextEntry1] : L ankle/foot [FreeTextEntry4] : May 2022 [FreeTextEntry5] : Pt. is 48 y/o F presenting for an evaluation of the left foot and ankle. Pt states this is a late follow up of sorts for her left foot from May (was seen in [facility]), but that she also would like a new scrip for orthotics. Pt got hurt the third week of May 2022, stepping on her dog's leash. Pt states she was told she broke her big toe. Pt has pain when putting pressure on her toes on the left foot. [de-identified] : Putting pressure on the toes [de-identified] : O [de-identified] : xray

## 2022-09-04 ENCOUNTER — FORM ENCOUNTER (OUTPATIENT)
Age: 47
End: 2022-09-04

## 2022-09-05 ENCOUNTER — APPOINTMENT (OUTPATIENT)
Dept: MRI IMAGING | Facility: CLINIC | Age: 47
End: 2022-09-05

## 2022-09-05 PROCEDURE — 73718 MRI LOWER EXTREMITY W/O DYE: CPT | Mod: LT

## 2022-09-19 ENCOUNTER — NON-APPOINTMENT (OUTPATIENT)
Age: 47
End: 2022-09-19

## 2022-09-19 DIAGNOSIS — S96.912A STRAIN OF UNSPECIFIED MUSCLE AND TENDON AT ANKLE AND FOOT LEVEL, LEFT FOOT, INITIAL ENCOUNTER: ICD-10-CM

## 2022-09-21 ENCOUNTER — APPOINTMENT (OUTPATIENT)
Dept: ORTHOPEDIC SURGERY | Facility: CLINIC | Age: 47
End: 2022-09-21

## 2023-03-24 ENCOUNTER — APPOINTMENT (OUTPATIENT)
Dept: ORTHOPEDIC SURGERY | Facility: CLINIC | Age: 48
End: 2023-03-24
Payer: COMMERCIAL

## 2023-03-24 VITALS — BODY MASS INDEX: 31.24 KG/M2 | HEIGHT: 64 IN | WEIGHT: 183 LBS

## 2023-03-24 PROCEDURE — 73030 X-RAY EXAM OF SHOULDER: CPT | Mod: LT

## 2023-03-24 PROCEDURE — 73010 X-RAY EXAM OF SHOULDER BLADE: CPT | Mod: LT

## 2023-03-24 PROCEDURE — 99214 OFFICE O/P EST MOD 30 MIN: CPT

## 2023-03-24 RX ORDER — METHYLPREDNISOLONE 4 MG/1
4 TABLET ORAL
Qty: 1 | Refills: 0 | Status: ACTIVE | COMMUNITY
Start: 2023-03-24 | End: 1900-01-01

## 2023-03-24 NOTE — IMAGING
[Left] : left shoulder [FreeTextEntry1] : The AC and GH joints are fine. Osteopenia is noted.  [FreeTextEntry5] : There is a Type I-II acromion.

## 2023-03-24 NOTE — PHYSICAL EXAM
[Left] : left shoulder [Sitting] : sitting [Mild] : mild [5 ___] : forward flexion 5[unfilled]/5 [5___] : external rotation 5[unfilled]/5 [Right] : right shoulder [] : no swelling [FreeTextEntry9] : IR to T12. [TWNoteComboBox4] : passive forward flexion 165 degrees [de-identified] : external rotation 75 degrees

## 2023-03-24 NOTE — ASSESSMENT
[FreeTextEntry1] : We reviewed the findings and the history.\par Questions were answered and concerned addressed.\par Start PT\par Medrol is prescribed. \par Aspirin allergy so no NSAIDs\par RTO in 4 weeks. \par Cryo-Cuff discussed. \par Bone density test to review with her PCP\par The options were outlined.\par \par Patient seen by Dr. Judd Ash.\par Progress note completed by Anne FLORIAN.\par Patient seen by Anne FLORIAN under the supervision of Dr. Judd Ash.\par Entered by Anne FLORIAN acting as a scribe for Dr. Judd Ash.

## 2023-03-24 NOTE — HISTORY OF PRESENT ILLNESS
[6] : 6 [0] : 0 [Rest] : rest [] : no [FreeTextEntry1] : left shoulder  [FreeTextEntry5] : pt tripped and fell getting out of the shower and hit her shoulder into the toilet tank in November [de-identified] : movement

## 2023-03-24 NOTE — CONSULT LETTER
[Dear  ___] : Dear  [unfilled], [Consult Letter:] : I had the pleasure of evaluating your patient, [unfilled]. [Please see my note below.] : Please see my note below. [Consult Closing:] : Thank you very much for allowing me to participate in the care of this patient.  If you have any questions, please do not hesitate to contact me. [Sincerely,] : Sincerely, [FreeTextEntry3] : Dr. Judd Ash\par Shoulder Surgery\par

## 2023-03-24 NOTE — REASON FOR VISIT
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  No treatment thus far.  If she lies on her left side, she can wake up.  She avoids NSAIDs as the pain is not constant.  No numbness.  Reaching for her seatbelt and bra are uncomfortable.  On 12/14/2021, Dr. Ash performed a Right Shoulder Arthroscopy, Glenohumeral Debridement, Synovectomy, Lysis of Adhesions, Manipulation, Glenohumeral Injection.  She had an excellent recovery.  She is fearful she will develop a left frozen shoulder.

## 2023-04-10 ENCOUNTER — APPOINTMENT (OUTPATIENT)
Dept: RADIOLOGY | Facility: CLINIC | Age: 48
End: 2023-04-10

## 2023-04-24 ENCOUNTER — APPOINTMENT (OUTPATIENT)
Dept: ORTHOPEDIC SURGERY | Facility: CLINIC | Age: 48
End: 2023-04-24
Payer: COMMERCIAL

## 2023-04-24 VITALS — BODY MASS INDEX: 31.58 KG/M2 | WEIGHT: 185 LBS | HEIGHT: 64 IN

## 2023-04-24 PROCEDURE — 99214 OFFICE O/P EST MOD 30 MIN: CPT

## 2023-04-24 NOTE — ASSESSMENT
[FreeTextEntry1] : She had made nice gains.\par PT will continue.\par She will be diligent with her HEP.\par She is hopeful and encouraged.\par \par Patient seen by Dr. Judd Ash.\par Progress note completed by Anne FLORIAN.\par Patient seen by Anne FLORIAN under the supervision of Dr. Judd Ash.\par Entered by Anne FLORIAN acting as a scribe for Dr. Judd Ash.\par Entered by Joy Hayden acting as scribe.

## 2023-04-24 NOTE — HISTORY OF PRESENT ILLNESS
[de-identified] : pt is here today for a follow up for her left shoulder. pt states she has felt some improvement to her shoulder since last visit  [FreeTextEntry1] : left shoulder  [de-identified] : physical therapy

## 2023-04-24 NOTE — PHYSICAL EXAM
[Left] : left shoulder [Sitting] : sitting [Mild] : mild [5 ___] : forward flexion 5[unfilled]/5 [5___] : external rotation 5[unfilled]/5 [Right] : right shoulder [] : no swelling [de-identified] : This is much less. [FreeTextEntry9] : IR to T12. [de-identified] : external rotation 75 degrees [TWNoteComboBox4] : passive forward flexion 165 degrees

## 2023-05-17 ENCOUNTER — RESULT REVIEW (OUTPATIENT)
Age: 48
End: 2023-05-17

## 2023-05-19 ENCOUNTER — RESULT REVIEW (OUTPATIENT)
Age: 48
End: 2023-05-19

## 2023-05-19 ENCOUNTER — APPOINTMENT (OUTPATIENT)
Dept: RADIOLOGY | Facility: CLINIC | Age: 48
End: 2023-05-19
Payer: COMMERCIAL

## 2023-05-19 ENCOUNTER — OUTPATIENT (OUTPATIENT)
Dept: OUTPATIENT SERVICES | Facility: HOSPITAL | Age: 48
LOS: 1 days | End: 2023-05-19
Payer: COMMERCIAL

## 2023-05-19 DIAGNOSIS — Z98.891 HISTORY OF UTERINE SCAR FROM PREVIOUS SURGERY: Chronic | ICD-10-CM

## 2023-05-19 DIAGNOSIS — M75.42 IMPINGEMENT SYNDROME OF LEFT SHOULDER: ICD-10-CM

## 2023-05-19 DIAGNOSIS — Z90.89 ACQUIRED ABSENCE OF OTHER ORGANS: Chronic | ICD-10-CM

## 2023-05-19 DIAGNOSIS — Z98.890 OTHER SPECIFIED POSTPROCEDURAL STATES: Chronic | ICD-10-CM

## 2023-05-19 PROCEDURE — 77080 DXA BONE DENSITY AXIAL: CPT

## 2023-05-19 PROCEDURE — 77080 DXA BONE DENSITY AXIAL: CPT | Mod: 26

## 2023-05-22 ENCOUNTER — APPOINTMENT (OUTPATIENT)
Dept: ORTHOPEDIC SURGERY | Facility: CLINIC | Age: 48
End: 2023-05-22
Payer: COMMERCIAL

## 2023-05-22 VITALS — HEIGHT: 64 IN | BODY MASS INDEX: 31.58 KG/M2 | WEIGHT: 185 LBS

## 2023-05-22 PROCEDURE — 20611 DRAIN/INJ JOINT/BURSA W/US: CPT

## 2023-05-22 PROCEDURE — 99214 OFFICE O/P EST MOD 30 MIN: CPT | Mod: 25

## 2023-05-22 NOTE — DATA REVIEWED
[FreeTextEntry1] : LEFT SHOULDER MRI ([facility]) 5/17/23:\par There is no major cuff tearing. There is decreased capsular volume. The rotator cuff is intact. The muscle is good. The biceps is ok. There may be minor labral tearing.

## 2023-05-22 NOTE — HISTORY OF PRESENT ILLNESS
[5] : 5 [0] : 0 [de-identified] : Pt is here for MRI review of the left shoulder. Pt states pain has increased since the last visit.  [FreeTextEntry1] : L shoulder [de-identified] : PT/HEP

## 2023-05-22 NOTE — PHYSICAL EXAM
[Left] : left shoulder [Sitting] : sitting [Mild] : mild [5 ___] : forward flexion 5[unfilled]/5 [5___] : external rotation 5[unfilled]/5 [Right] : right shoulder [Moderate] : moderate [] : no swelling [de-identified] : This is much less. [FreeTextEntry9] : IR to T12. [TWNoteComboBox6] : internal rotation L3 [TWNoteComboBox4] : passive forward flexion 165 degrees [de-identified] : external rotation 75 degrees

## 2023-05-22 NOTE — ASSESSMENT
[FreeTextEntry1] : We reviewed the MRI findings. \par An injection is indicated today. \par PT will continue. \par Questions answered. \par \par Patient seen by Judd Ash M.D.\par Entered by Joy Hayden acting as scribe. \par \par \par Procedure Name: Large Joint Injection / Aspiration: Depomedrol, Lidocaine and Guidance Ultrasound\par \par Large Joint Injection was performed because of pain and inflammation.\par Depomedrol: An injection of Depomedrol 40 mg , 2 cc.\par Lidocaine: An injection of Lidocaine 1 mg , 13 cc.\par \par Medication was injected in the left subacromial space and glenohumeral joint. Patient has tried OTC's including aspirin, Ibuprofen, Aleve etc or prescription NSAIDS, and/or exercises at home and/ or physical therapy without satisfactory response. The risks, benefits, and alternatives to steroid injection were explained in full to the patient. Risks outlined include but are not limited to infection, sepsis, bleeding, scarring, skin discoloration, temporary increase in pain, syncopal episode, failure to resolve symptoms, allergic reaction, symptom recurrence, and elevation of blood sugar in diabetics. Patient understood the risks. All questions were answered. After discussion, patient requested an injection. Oral informed consent was obtained.  Sterile preparation with betadine and aseptic technique was utilized for the procedure, including the preparation of the solutions used for the injection. Patient tolerated the procedure well.  \par Post Procedure Instructions: Patient was advised to call if redness, pain, or fever occur and apply ice for 15 min. out of every hour for the next 12-24 hours as tolerated. Patient was advised to rest the joint(s) for 3 days.  Advised to ice the injection site this evening.\par Ultrasound Guidance was used for the following reasons: for precise injection in area of tear. Visualization of the needle and placement of injection was performed without complication.

## 2023-05-22 NOTE — REASON FOR VISIT
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  No numbness.  Reaching for her seatbelt and bra are uncomfortable.  On 12/14/2021, Dr. Ash performed a Right Shoulder Arthroscopy, Glenohumeral Debridement, Synovectomy, Lysis of Adhesions, Manipulation, Glenohumeral Injection.  She had an excellent recovery.  Overall, she is feeling better.  She has been icing and going to physical therapy.  Overall, she is feeling better.  There is still some tightness with ER.  The MDP didn't seem to do much. She has an allergy to aspirin. The MRI was done 5/17/23.

## 2023-05-24 ENCOUNTER — NON-APPOINTMENT (OUTPATIENT)
Age: 48
End: 2023-05-24

## 2023-06-19 ENCOUNTER — APPOINTMENT (OUTPATIENT)
Dept: ORTHOPEDIC SURGERY | Facility: CLINIC | Age: 48
End: 2023-06-19
Payer: COMMERCIAL

## 2023-06-19 PROCEDURE — 99214 OFFICE O/P EST MOD 30 MIN: CPT

## 2023-06-19 NOTE — DATA REVIEWED
[FreeTextEntry1] : LEFT SHOULDER MRI ([facility]) 5/17/23:\par There is no major cuff tearing. There is decreased capsular volume. The rotator cuff is intact. The muscle is good. The biceps is ok. There may be minor labral tearing. \par \par XR: The AC and GH joints are fine. Osteopenia is noted.  There is a Type I-II acromion.

## 2023-06-19 NOTE — PHYSICAL EXAM
[Left] : left shoulder [Moderate] : moderate [Mild] : mild [5 ___] : forward flexion 5[unfilled]/5 [5___] : external rotation 5[unfilled]/5 [Right] : right shoulder [Sitting] : sitting [] : no swelling [FreeTextEntry9] : IR to T10. [TWNoteComboBox6] : internal rotation L1 [TWNoteComboBox4] : passive forward flexion 170 degrees [de-identified] : external rotation 90 degrees

## 2023-06-19 NOTE — ASSESSMENT
[FreeTextEntry1] : We discussed her issues.\par She is frustrated, tho has some clinical improvement.\par We will give this more time.\par She will cont PT and HEP.\par Mobic is prescribed.\par We reviewed the osteoporosis finding.\par If there are persistent sx in 8 weeks or so, we will again discuss surgery.\par Questions answered.\par \par Patient seen by Judd Ash M.D.\par

## 2023-06-19 NOTE — REASON FOR VISIT
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  No numbness.  Reaching for her seatbelt and bra are uncomfortable.  On 12/14/2021, Dr. Ash performed a Right Shoulder Arthroscopy, Glenohumeral Debridement, Synovectomy, Lysis of Adhesions, Manipulation, Glenohumeral Injection.  She had an excellent recovery.  The MDP didn't seem to do much. She has an allergy to aspirin. The MRI was done 5/17/23.  The L SA/GH injection helped very short term.  She has continued with PT, though per pain and functional limitations persist, if not worse.

## 2023-06-19 NOTE — HISTORY OF PRESENT ILLNESS
[9] : 9 [0] : 0 [de-identified] : pt is here today for a follow up for her left shoulder. pt states her pain has been getting worse since last visit, cortisone injection provided minimal relief  [FreeTextEntry1] : left shoulder  [de-identified] : physical therapy

## 2023-07-21 ENCOUNTER — APPOINTMENT (OUTPATIENT)
Dept: ORTHOPEDIC SURGERY | Facility: CLINIC | Age: 48
End: 2023-07-21
Payer: COMMERCIAL

## 2023-07-21 VITALS — BODY MASS INDEX: 31.58 KG/M2 | HEIGHT: 64 IN | WEIGHT: 185 LBS

## 2023-07-21 PROCEDURE — 99214 OFFICE O/P EST MOD 30 MIN: CPT

## 2023-07-24 NOTE — CONSULT LETTER
[Courtesy Letter:] : I had the pleasure of seeing your patient, [unfilled], in my office today. [Sincerely,] : Sincerely, [FreeTextEntry1] : Please see my note below.  If you have any questions, please do not hesitate to contact me. [FreeTextEntry3] : Judd Mckinney M.D.\par Shoulder Surgery

## 2023-07-24 NOTE — PHYSICAL EXAM
[Left] : left shoulder [Mild] : mild [5 ___] : forward flexion 5[unfilled]/5 [5___] : external rotation 5[unfilled]/5 [Right] : right shoulder [Sitting] : sitting [Moderate] : moderate [] : no swelling [FreeTextEntry9] : IR to T10. [TWNoteComboBox6] : internal rotation L3 [TWNoteComboBox4] : passive forward flexion 170 degrees [de-identified] : external rotation 90 degrees

## 2023-07-24 NOTE — ASSESSMENT
[FreeTextEntry1] : \par We discussed treatment options, both non-operative and operative.  I do think she is a candidate for surgery.  Pain relief is a goal as well as improving function and motion.  I reviewed surgical techniques pictorially in the books that I co-edited.\par \par Interscalene anesthesia, general anesthesia and postoperative pain management were discussed.  The importance of physical therapy postoperatively, the gradual recovery and the rehabilitation program with initial driving restrictions were noted.  The use of a Cryo-Cuff by [employer] and a sling for functional recovery was reviewed.  She understands there are no guarantees.  The benefits of decreased pain, increased function and restoring anatomy were outlined.  The risks were reviewed including, but not limited to, infection, failure, bleeding, stiffness, pain, clotting, fracture, re-tear if there is a repair, hardware failure, deformity, functional limitation, scarring, neurovascular compromise, and narcotic use issues.  Under certain circumstances we discussed, further surgery may be indicated.\par \par She understands that 100% recovery is not assured, and the desired level of function may not be achievable.  We discussed the potential for a prolonged recovery course and the potential for this to affect her activities, which could include a work regimen.  Her questions were answered.  Other opinions can be pursued, as we discussed.\par \par She does wish to proceed with surgery.  This would include a left shoulder arthroscopy, debridement, synovectomy, lysis of adhesions, manipulation, injection, distal clavicle resection.  Medical clearance is planned.  We will schedule this at the earliest mutual convenient time.\par

## 2023-07-24 NOTE — HISTORY OF PRESENT ILLNESS
[10] : 10 [0] : 0 [Sleep] : sleep [de-identified] : pt is here today for a follow up for her left shoulder. pt states her pain is similar to last visit, feels no more progress  [FreeTextEntry1] : left shoulder  [de-identified] : physical therapy and meloxicam used as needed, not much relief with it

## 2023-08-01 ENCOUNTER — OUTPATIENT (OUTPATIENT)
Dept: OUTPATIENT SERVICES | Facility: HOSPITAL | Age: 48
LOS: 1 days | End: 2023-08-01
Payer: COMMERCIAL

## 2023-08-01 VITALS
OXYGEN SATURATION: 98 % | RESPIRATION RATE: 16 BRPM | WEIGHT: 192.02 LBS | HEIGHT: 64 IN | HEART RATE: 82 BPM | TEMPERATURE: 99 F | SYSTOLIC BLOOD PRESSURE: 129 MMHG | DIASTOLIC BLOOD PRESSURE: 83 MMHG

## 2023-08-01 DIAGNOSIS — Z98.890 OTHER SPECIFIED POSTPROCEDURAL STATES: Chronic | ICD-10-CM

## 2023-08-01 DIAGNOSIS — M75.02 ADHESIVE CAPSULITIS OF LEFT SHOULDER: ICD-10-CM

## 2023-08-01 DIAGNOSIS — I10 ESSENTIAL (PRIMARY) HYPERTENSION: ICD-10-CM

## 2023-08-01 DIAGNOSIS — E03.9 HYPOTHYROIDISM, UNSPECIFIED: ICD-10-CM

## 2023-08-01 DIAGNOSIS — Z91.89 OTHER SPECIFIED PERSONAL RISK FACTORS, NOT ELSEWHERE CLASSIFIED: ICD-10-CM

## 2023-08-01 DIAGNOSIS — Z90.89 ACQUIRED ABSENCE OF OTHER ORGANS: Chronic | ICD-10-CM

## 2023-08-01 DIAGNOSIS — Z98.891 HISTORY OF UTERINE SCAR FROM PREVIOUS SURGERY: Chronic | ICD-10-CM

## 2023-08-01 LAB
ANION GAP SERPL CALC-SCNC: 16 MMOL/L — HIGH (ref 7–14)
BUN SERPL-MCNC: 11 MG/DL — SIGNIFICANT CHANGE UP (ref 7–23)
CALCIUM SERPL-MCNC: 9.3 MG/DL — SIGNIFICANT CHANGE UP (ref 8.4–10.5)
CHLORIDE SERPL-SCNC: 103 MMOL/L — SIGNIFICANT CHANGE UP (ref 98–107)
CO2 SERPL-SCNC: 20 MMOL/L — LOW (ref 22–31)
CREAT SERPL-MCNC: 0.62 MG/DL — SIGNIFICANT CHANGE UP (ref 0.5–1.3)
EGFR: 108 ML/MIN/1.73M2 — SIGNIFICANT CHANGE UP
GLUCOSE SERPL-MCNC: 86 MG/DL — SIGNIFICANT CHANGE UP (ref 70–99)
HCG SERPL-ACNC: <1 MIU/ML — SIGNIFICANT CHANGE UP
HCT VFR BLD CALC: 41.9 % — SIGNIFICANT CHANGE UP (ref 34.5–45)
HGB BLD-MCNC: 14 G/DL — SIGNIFICANT CHANGE UP (ref 11.5–15.5)
MCHC RBC-ENTMCNC: 29.4 PG — SIGNIFICANT CHANGE UP (ref 27–34)
MCHC RBC-ENTMCNC: 33.4 GM/DL — SIGNIFICANT CHANGE UP (ref 32–36)
MCV RBC AUTO: 88 FL — SIGNIFICANT CHANGE UP (ref 80–100)
NRBC # BLD: 0 /100 WBCS — SIGNIFICANT CHANGE UP (ref 0–0)
NRBC # FLD: 0 K/UL — SIGNIFICANT CHANGE UP (ref 0–0)
PLATELET # BLD AUTO: 371 K/UL — SIGNIFICANT CHANGE UP (ref 150–400)
POTASSIUM SERPL-MCNC: 3.6 MMOL/L — SIGNIFICANT CHANGE UP (ref 3.5–5.3)
POTASSIUM SERPL-SCNC: 3.6 MMOL/L — SIGNIFICANT CHANGE UP (ref 3.5–5.3)
RBC # BLD: 4.76 M/UL — SIGNIFICANT CHANGE UP (ref 3.8–5.2)
RBC # FLD: 12.8 % — SIGNIFICANT CHANGE UP (ref 10.3–14.5)
SODIUM SERPL-SCNC: 139 MMOL/L — SIGNIFICANT CHANGE UP (ref 135–145)
WBC # BLD: 10.16 K/UL — SIGNIFICANT CHANGE UP (ref 3.8–10.5)
WBC # FLD AUTO: 10.16 K/UL — SIGNIFICANT CHANGE UP (ref 3.8–10.5)

## 2023-08-01 PROCEDURE — 93010 ELECTROCARDIOGRAM REPORT: CPT

## 2023-08-01 RX ORDER — OMEGA-3 ACID ETHYL ESTERS 1 G
1 CAPSULE ORAL
Qty: 0 | Refills: 0 | DISCHARGE

## 2023-08-01 RX ORDER — MILK THISTLE 150 MG
1 CAPSULE ORAL
Qty: 0 | Refills: 0 | DISCHARGE

## 2023-08-01 NOTE — H&P PST ADULT - PROBLEM SELECTOR PLAN 1
Patient tentatively scheduled for left shoulder arthroscopy debridement synovectomy lysis of adhesions manipulation distal clavicle resection with exparel for 8/8/23. Pre-op instructions provided. Pt given verbal and written instructions with teach back on chlorhexidine shampoo and pepcid. Pt verbalized understanding with return demonstration.     CBC BMP HCG EKG done.

## 2023-08-01 NOTE — H&P PST ADULT - NSICDXPASTSURGICALHX_GEN_ALL_CORE_FT
PAST SURGICAL HISTORY:  H/O:  x2    History of arthroscopy of right shoulder     History of D&C     S/P dilation and curettage     S/P tendon repair     S/P tonsillectomy

## 2023-08-01 NOTE — H&P PST ADULT - HISTORY OF PRESENT ILLNESS
49 y/o female PMH HTN HLD asthma presents to presurgical testing with diagnosis of adhesive capsulitis of left shoulder. Pt with history of right shoulder arthroscopy in 2021. Pt with persistent left shoulder pain despite conservative management. Pt is scheduled for a left shoulder arthroscopy debridement synovectomy lysis of adhesions manipulation distal clavicle resection with exparel.

## 2023-08-01 NOTE — H&P PST ADULT - NSICDXPASTMEDICALHX_GEN_ALL_CORE_FT
PAST MEDICAL HISTORY:  Adhesive capsulitis of left shoulder     Adhesive capsulitis of right shoulder     Asthma     Disorder of right rotator cuff     History of fibrocystic disease of breast     HLD (hyperlipidemia)     HTN (hypertension)     Hypothyroid     Labral tear of shoulder, right, initial encounter     Menorrhagia with irregular cycle     Seasonal asthma     Thyroid nodule     Uterine polyp

## 2023-08-01 NOTE — H&P PST ADULT - MUSCULOSKELETAL
left  shoulder/no joint swelling/decreased ROM due to pain/normal gait/strength 5/5 bilateral upper extremities/strength 5/5 bilateral lower extremities details…

## 2023-08-01 NOTE — H&P PST ADULT - OTHER CARE PROVIDERS
· Low-density centrally in the uterus measuring up to 7 mm in thickness   Follow-up pelvic ultrasound is recommended to assess for abnormal endometrial thickening or endometrial lesion  · [phone]

## 2023-08-01 NOTE — H&P PST ADULT - PAIN SCORE
Procedure(s):  C2-4 LAMINECTOMY, C2-T2 INSTRUMENTED FUSION. general    Anesthesia Post Evaluation      Multimodal analgesia: multimodal analgesia not used between 6 hours prior to anesthesia start to PACU discharge  Patient location during evaluation: PACU  Patient participation: complete - patient participated  Level of consciousness: awake and alert  Pain score: 1  Pain management: satisfactory to patient  Airway patency: patent  Anesthetic complications: no  Cardiovascular status: acceptable  Respiratory status: acceptable  Hydration status: acceptable  Post anesthesia nausea and vomiting:  none  Final Post Anesthesia Temperature Assessment:  Normothermia (36.0-37.5 degrees C)      INITIAL Post-op Vital signs:   Vitals Value Taken Time   /80 01/21/21 1515   Temp 36.6 °C (97.8 °F) 01/21/21 1217   Pulse 75 01/21/21 1518   Resp 18 01/21/21 1518   SpO2 98 % 01/21/21 1518   Vitals shown include unvalidated device data.
0

## 2023-08-01 NOTE — H&P PST ADULT - MOUTH
Testosterone injection given. Patient aware to return to clinic in 2 weeks for next injection. Patient tolerated without incident. See MAR for documentation. normal mouth and gums/moist

## 2023-08-04 RX ORDER — GABAPENTIN 300 MG/1
300 CAPSULE ORAL
Qty: 15 | Refills: 0 | Status: ACTIVE | COMMUNITY
Start: 2023-08-04 | End: 1900-01-01

## 2023-08-04 RX ORDER — KETOROLAC TROMETHAMINE 10 MG/1
10 TABLET, FILM COATED ORAL EVERY 6 HOURS
Qty: 20 | Refills: 0 | Status: ACTIVE | COMMUNITY
Start: 2023-08-04 | End: 1900-01-01

## 2023-08-04 RX ORDER — HYDROCODONE BITARTRATE AND ACETAMINOPHEN 7.5; 325 MG/1; MG/1
7.5-325 TABLET ORAL
Qty: 42 | Refills: 0 | Status: ACTIVE | COMMUNITY
Start: 2023-08-04 | End: 1900-01-01

## 2023-08-07 ENCOUNTER — TRANSCRIPTION ENCOUNTER (OUTPATIENT)
Age: 48
End: 2023-08-07

## 2023-08-08 ENCOUNTER — TRANSCRIPTION ENCOUNTER (OUTPATIENT)
Age: 48
End: 2023-08-08

## 2023-08-08 ENCOUNTER — OUTPATIENT (OUTPATIENT)
Dept: OUTPATIENT SERVICES | Facility: HOSPITAL | Age: 48
LOS: 1 days | Discharge: HOME | End: 2023-08-08

## 2023-08-08 ENCOUNTER — APPOINTMENT (OUTPATIENT)
Dept: ORTHOPEDIC SURGERY | Facility: AMBULATORY SURGERY CENTER | Age: 48
End: 2023-08-08
Payer: COMMERCIAL

## 2023-08-08 VITALS
SYSTOLIC BLOOD PRESSURE: 138 MMHG | HEIGHT: 64 IN | DIASTOLIC BLOOD PRESSURE: 83 MMHG | OXYGEN SATURATION: 99 % | HEART RATE: 78 BPM | WEIGHT: 192.02 LBS | TEMPERATURE: 98 F | RESPIRATION RATE: 18 BRPM

## 2023-08-08 VITALS
SYSTOLIC BLOOD PRESSURE: 121 MMHG | RESPIRATION RATE: 13 BRPM | HEART RATE: 79 BPM | TEMPERATURE: 98 F | DIASTOLIC BLOOD PRESSURE: 70 MMHG | OXYGEN SATURATION: 98 %

## 2023-08-08 DIAGNOSIS — Z98.890 OTHER SPECIFIED POSTPROCEDURAL STATES: Chronic | ICD-10-CM

## 2023-08-08 DIAGNOSIS — Z90.89 ACQUIRED ABSENCE OF OTHER ORGANS: Chronic | ICD-10-CM

## 2023-08-08 DIAGNOSIS — M75.02 ADHESIVE CAPSULITIS OF LEFT SHOULDER: ICD-10-CM

## 2023-08-08 DIAGNOSIS — Z98.891 HISTORY OF UTERINE SCAR FROM PREVIOUS SURGERY: Chronic | ICD-10-CM

## 2023-08-08 PROCEDURE — 29823 SHO ARTHRS SRG XTNSV DBRDMT: CPT | Mod: LT

## 2023-08-08 PROCEDURE — 20610 DRAIN/INJ JOINT/BURSA W/O US: CPT | Mod: 59,LT

## 2023-08-08 PROCEDURE — 29821 SHO ARTHRS SRG COMPL SYNVCT: CPT | Mod: 59,LT

## 2023-08-08 PROCEDURE — 29825 SHO ARTHRS SRG LSS&RESCJ ADS: CPT | Mod: 59,LT

## 2023-08-08 PROCEDURE — 29824 SHO ARTHRS SRG DSTL CLAVICLC: CPT | Mod: 59,LT

## 2023-08-08 RX ORDER — DOCUSATE SODIUM 100 MG
1 CAPSULE ORAL
Refills: 0 | DISCHARGE

## 2023-08-08 RX ORDER — CETIRIZINE HYDROCHLORIDE 10 MG/1
1 TABLET ORAL
Refills: 0 | DISCHARGE

## 2023-08-08 RX ORDER — ROSUVASTATIN CALCIUM 5 MG/1
1 TABLET ORAL
Qty: 0 | Refills: 0 | DISCHARGE

## 2023-08-08 RX ORDER — LEVOTHYROXINE SODIUM 125 MCG
1 TABLET ORAL
Qty: 0 | Refills: 0 | DISCHARGE

## 2023-08-08 RX ORDER — CHOLECALCIFEROL (VITAMIN D3) 125 MCG
1 CAPSULE ORAL
Qty: 0 | Refills: 0 | DISCHARGE

## 2023-08-08 RX ORDER — ALBUTEROL 90 UG/1
2 AEROSOL, METERED ORAL
Qty: 0 | Refills: 0 | DISCHARGE

## 2023-08-08 RX ORDER — AMLODIPINE BESYLATE 2.5 MG/1
1 TABLET ORAL
Qty: 0 | Refills: 0 | DISCHARGE

## 2023-08-08 RX ORDER — GABAPENTIN 400 MG/1
2 CAPSULE ORAL
Refills: 0 | DISCHARGE

## 2023-08-08 NOTE — ASU PREOPERATIVE ASSESSMENT, ADULT ([facility] ONLY) - FALL HARM RISK - UNIVERSAL INTERVENTIONS
Bed in lowest position, wheels locked, appropriate side rails in place/Call bell, personal items and telephone in reach/Instruct patient to call for assistance before getting out of bed or chair/Non-slip footwear when patient is out of bed/[place] to call system/Physically safe environment - no spills, clutter or unnecessary equipment/Purposeful Proactive Rounding/Room/bathroom lighting operational, light cord in reach

## 2023-08-08 NOTE — ASU DISCHARGE PLAN (ADULT/PEDIATRIC) - NURSING INSTRUCTIONS
You were given IV Toradol for pain management in the Operating Room. Please do NOT take Ibuprofen (NSAIDS) products (Motrin, Aleve, Advil) for the next 6-8 hours (After  _______). Please take ibuprophen with food to avoid gastrointestinal issues.     IF you have steri strips, you may shower with steri strips on. After showering, pat dry steri strips.  Do not rub them.  They will curl up and fall off by themselves within 7 days. Avoid using creams, lotions, or ointments around the steri strip area.

## 2023-08-08 NOTE — ASU DISCHARGE PLAN (ADULT/PEDIATRIC) - CARE PROVIDER_API CALL
Judd Ash  Orthopaedic Surgery  [address] [place] [id number]  Phone: [phone]  Fax: [phone]  Follow Up Time: 1 week

## 2023-08-08 NOTE — BRIEF OPERATIVE NOTE - NSICDXBRIEFPROCEDURE_GEN_ALL_CORE_FT
PROCEDURES:  Claviculectomy, distal 08-Aug-2023 14:27:53  Natacha Ferreira  Left shoulder arthroscopy 08-Aug-2023 14:28:09  Natacha Ferreira

## 2023-08-08 NOTE — ASU DISCHARGE PLAN (ADULT/PEDIATRIC) - ASU DC SPECIAL INSTRUCTIONSFT
Please refer to Dr. Ash's handout for more specific instructions    You have received a nerve block. This will help to control your pain after surgery. You will have difficulty moving and feeling the limb until this wears off. Nerve blocks typically last for about 24 hours (range 12 to 36 hours). You may have some tingling or burning sensations as the block wears off. Start taking your Ibuprofen and Tylenol right away when you get home. Take the oxycodone at the first sign of discomfort. Wear the sling until you are able to move and control your elbow and shoulder.

## 2023-08-18 ENCOUNTER — APPOINTMENT (OUTPATIENT)
Dept: ORTHOPEDIC SURGERY | Facility: CLINIC | Age: 48
End: 2023-08-18
Payer: COMMERCIAL

## 2023-08-18 PROBLEM — M75.02 ADHESIVE CAPSULITIS OF LEFT SHOULDER: Chronic | Status: ACTIVE | Noted: 2023-08-01

## 2023-08-18 PROCEDURE — 73030 X-RAY EXAM OF SHOULDER: CPT | Mod: LT

## 2023-08-18 PROCEDURE — 99024 POSTOP FOLLOW-UP VISIT: CPT

## 2023-08-18 NOTE — REASON FOR VISIT
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   On 12/14/2021, Dr. Ash performed a Right Shoulder Arthroscopy, Glenohumeral Debridement, Synovectomy, Lysis of Adhesions, Manipulation, Glenohumeral Injection.  DOS: 8/8/2023 Procedure: Left Shoulder Arthroscopy, Glenohumeral Debridement, Synovectomy, Lysis of Adhesions, Manipulation, Distal Clavicle Resection, Glenohumeral Injection Diagnosis: Adhesive Capsulitis, AC Arthralgia, Shoulder Pain, Glenohumeral Synovitis, Glenohumeral Chondrosis, Partial Anterior Labral Tear, Partial Posterior Labral Tear, Partial Rotator Cuff Tear  No f/c/s.  She has started PT.  The pain meds help to sleep.  She has had two episodes of vasovagal (near) syncope at PT.  She has a history of this.  She is in her sling.

## 2023-08-18 NOTE — PHYSICAL EXAM
[Left] : left shoulder [Supine] : supine [Mild] : mild [] : no sensory deficits [de-identified] : Strength was not assessed. [TWNoteComboBox4] : passive forward flexion 150 degrees [de-identified] : external rotation 50 degrees

## 2023-08-18 NOTE — HISTORY OF PRESENT ILLNESS
[] : Post Surgical Visit: yes [de-identified] : Pt is here for first post op visit s/p left shoulder arthroscopy. Doing well.  [FreeTextEntry1] : left shoulder [de-identified] : yury Beatty [de-identified] : 8/8/23 [de-identified] : left shoulder arthroscopy

## 2023-08-18 NOTE — ASSESSMENT
[FreeTextEntry1] : We reviewed the scope pictures. PT outlined. Cont HEP. RTW discussed. Her daughter is here. Questions answered.  Patient was seen by Dr. Judd Ash. Patient was seen by Anne FLORIAN under the supervision of Dr. Judd Ash. Progress note was completed by Anne FLORIAN.

## 2023-09-22 ENCOUNTER — APPOINTMENT (OUTPATIENT)
Dept: ORTHOPEDIC SURGERY | Facility: CLINIC | Age: 48
End: 2023-09-22
Payer: COMMERCIAL

## 2023-09-22 VITALS — HEIGHT: 67 IN | WEIGHT: 185 LBS | BODY MASS INDEX: 29.03 KG/M2

## 2023-09-22 PROCEDURE — 99024 POSTOP FOLLOW-UP VISIT: CPT

## 2023-09-22 RX ORDER — ALENDRONATE SODIUM 70 MG/1
70 TABLET ORAL
Refills: 0 | Status: ACTIVE | COMMUNITY

## 2023-09-22 RX ORDER — MELOXICAM 15 MG/1
15 TABLET ORAL
Qty: 30 | Refills: 1 | Status: ACTIVE | COMMUNITY
Start: 2023-06-19 | End: 1900-01-01

## 2023-11-21 NOTE — ASU PREOP CHECKLIST - ISOLATION PRECAUTIONS
none Female Pregnancy Counseling Text: Female patients should also be on two forms of birth control while taking this medication and for one month after their last dose.

## 2023-12-01 ENCOUNTER — APPOINTMENT (OUTPATIENT)
Dept: ORTHOPEDIC SURGERY | Facility: CLINIC | Age: 48
End: 2023-12-01
Payer: COMMERCIAL

## 2023-12-01 VITALS — WEIGHT: 190 LBS | HEIGHT: 64 IN | BODY MASS INDEX: 32.44 KG/M2

## 2023-12-01 DIAGNOSIS — M75.42 IMPINGEMENT SYNDROME OF LEFT SHOULDER: ICD-10-CM

## 2023-12-01 PROCEDURE — 99214 OFFICE O/P EST MOD 30 MIN: CPT

## 2024-01-05 NOTE — H&P PST ADULT - NS PRO LAST MENSTRUAL DATE
1/5/2024     Subjective:   No chief complaint on file.      HPI:   Diabetes  The patient is a 40 y.o. female who is seen for follow up of diabetes which is {stable:59322}.  Current symptoms: {Symptoms; diabetes:37016:x}.  Patient denies: {Symptoms; diabetes:29579:x}.  Home sugars: {home sugars dm:84692:x}.  Treatment to date: {Treatments; diabetes:96200:x}.   Last eye exam: ***  Last HbA1C:      Lab Results   Component Value Date    LABA1C 6.3 (H) 01/03/2024     Lab Results   Component Value Date     01/03/2024       Vitamin D deficiency  Taking supplement on a *** basis. Current level:   Lab Results   Component Value Date/Time    VITD25 59.3 01/03/2024 08:34 AM      Hyperlipidemia  The patient {is/is not:9024} following a low fat diet and {is/is not:9024} exercising regularly.  She {is/is not:9024} tolerating current treatment well and  {is/is not:9024} compliant.    Patient {is/is not:9024} having associated symptoms of {SYMPTOMS; CHEST PAIN ASSOCIATED:68450127}    Patient labs are YOUR LAST LIPID PROFILE:   Lab Results   Component Value Date    CHOL 156 01/03/2024    CHOL 169 04/28/2023    CHOL 173 10/20/2022     Lab Results   Component Value Date    TRIG 155 (H) 01/03/2024    TRIG 80 04/28/2023    TRIG 121 10/20/2022     Lab Results   Component Value Date    HDL 40 01/03/2024    HDL 47 04/28/2023    HDL 51 10/20/2022     Lab Results   Component Value Date    LDLCALC 85 01/03/2024    LDLCALC 106 (H) 04/28/2023    LDLCALC 97.8 10/20/2022     Lab Results   Component Value Date    LABVLDL 31 (H) 01/03/2024    LABVLDL 16 04/28/2023    LABVLDL 24.2 (H) 10/20/2022    VLDL 14 06/15/2021     Lab Results   Component Value Date    CHOLHDLRATIO 3.9 01/03/2024    CHOLHDLRATIO 3.6 04/28/2023    CHOLHDLRATIO 3.4 10/20/2022        Weight Management    Current weight: ***lbs  Previous weight 284 lbs  Weight gain/loss ***  Current dietary regimen: ***  Current exercise regimen: ***.  Appetite suppressant ***  Barriers to  current medications.  She ran out of Farxiga for a few weeks.  Migraine.  Well-controlled.  She has not had a flareup recently.  GERD stable.  Continue vitamin D supplement.  Morbid obesity.  Reinforced lifestyle changes.  I have increased her semaglutide to 2 mg per dose.  Encouraged to have more regular exercise including strength training.  Separate goal to about 15 pounds of weight loss in the next 6 months.  Hyperlipidemia.  Stable.  Opportunity to ask questions was offered and were answered to the best of my ability.  Call, send [employer] message or RTC if with questions or concerns  Follow-up will be in 6 months for physical with labs prior to her visit.  Advised to continue lifestyle changes: regular exercise at least 150 mins a week, well balanced diet rich in grains, fruits and vegetables, get at least 6-8 hrs of sleep a night.        Over 50% of today's office visit was spent in face to face time reviewing test results, prognosis, importance of compliance, education about disease process, benefits of medications, instructions for management of disease and follow up plans.  Total face to face time spent with patient was at least 25 minutes      There are no Patient Instructions on file for this visit.    No follow-up provider specified.    Vandana Dave MD       8/20/21

## 2024-02-19 ENCOUNTER — APPOINTMENT (OUTPATIENT)
Dept: ORTHOPEDIC SURGERY | Facility: CLINIC | Age: 49
End: 2024-02-19
Payer: COMMERCIAL

## 2024-02-19 PROCEDURE — 99214 OFFICE O/P EST MOD 30 MIN: CPT

## 2024-02-19 RX ORDER — CELECOXIB 200 MG/1
200 CAPSULE ORAL TWICE DAILY
Qty: 60 | Refills: 0 | Status: COMPLETED | COMMUNITY
Start: 2024-02-19 | End: 2024-03-20

## 2024-02-19 NOTE — REASON FOR VISIT
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   On 12/14/2021, Dr. Ash performed a Right Shoulder Arthroscopy, Glenohumeral Debridement, Synovectomy, Lysis of Adhesions, Manipulation, Glenohumeral Injection.  DOS: 8/8/2023 Procedure: Left Shoulder Arthroscopy, Glenohumeral Debridement, Synovectomy, Lysis of Adhesions, Manipulation, Distal Clavicle Resection, Glenohumeral Injection Diagnosis: Adhesive Capsulitis, AC Arthralgia, Shoulder Pain, Glenohumeral Synovitis, Glenohumeral Chondrosis, Partial Anterior Labral Tear, Partial Posterior Labral Tear, Partial Rotator Cuff Tear  She started to experience an increase in discomfort since mid-January 2024 with no new injury.  She has also noticed cracking since then. She is now on Fosamax.

## 2024-02-19 NOTE — PHYSICAL EXAM
[Left] : left shoulder [Standing] : standing [] : no sensory deficits [de-identified] : This is more tight. [TWNoteComboBox4] : passive forward flexion 165 degrees [TWNoteComboBox6] : internal rotation L1 [de-identified] : external rotation 70 degrees

## 2024-02-19 NOTE — HISTORY OF PRESENT ILLNESS
[de-identified] : Here for left shoulder follow up. States pain has worsened since mid January 2024 without injury. Notes a lot of cracking in the shoulder when she moves it.

## 2024-02-19 NOTE — ASSESSMENT
[FreeTextEntry1] : Course reviewed. She will speak with her endocrinologist about MDP and steroid injections. She may call for the MDP.  The patient is prescribed Celebrex. Cryocuff use discussed.  Cautions advised. Questions addressed. At follow up AP and outlet x-rays will be done.  Patient seen by Judd Ash Shoulder Surgery  The documentation recorded by the scribe accurately reflects the service I personally performed and the decisions made by me. Entered by Marcel Gutierrez acting as scribe.

## 2024-03-04 ENCOUNTER — APPOINTMENT (OUTPATIENT)
Dept: ORTHOPEDIC SURGERY | Facility: CLINIC | Age: 49
End: 2024-03-04
Payer: COMMERCIAL

## 2024-03-04 DIAGNOSIS — M75.02 ADHESIVE CAPSULITIS OF LEFT SHOULDER: ICD-10-CM

## 2024-03-04 DIAGNOSIS — M25.512 PAIN IN LEFT SHOULDER: ICD-10-CM

## 2024-03-04 DIAGNOSIS — M75.01 ADHESIVE CAPSULITIS OF RIGHT SHOULDER: ICD-10-CM

## 2024-03-04 PROCEDURE — 99214 OFFICE O/P EST MOD 30 MIN: CPT | Mod: 25

## 2024-03-04 PROCEDURE — 73030 X-RAY EXAM OF SHOULDER: CPT | Mod: LT

## 2024-03-04 PROCEDURE — 20611 DRAIN/INJ JOINT/BURSA W/US: CPT | Mod: LT

## 2024-03-04 NOTE — HISTORY OF PRESENT ILLNESS
[de-identified] : Here for left shoulder follow up. Notes no improvement since last visit. Pt had allergic reaction to Celebrex.

## 2024-03-04 NOTE — PHYSICAL EXAM
[Left] : left shoulder [Standing] : standing [] : negative impingement testing [de-identified] : This is more tight. [TWNoteComboBox4] : passive forward flexion 165 degrees [TWNoteComboBox6] : internal rotation L1 [de-identified] : external rotation 70 degrees

## 2024-03-04 NOTE — REASON FOR VISIT
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   On 12/14/2021, Dr. Ash performed a Right Shoulder Arthroscopy, Glenohumeral Debridement, Synovectomy, Lysis of Adhesions, Manipulation, Glenohumeral Injection.  DOS: 8/8/2023 Procedure: Left Shoulder Arthroscopy, Glenohumeral Debridement, Synovectomy, Lysis of Adhesions, Manipulation, Distal Clavicle Resection, Glenohumeral Injection Diagnosis: Adhesive Capsulitis, AC Arthralgia, Shoulder Pain, Glenohumeral Synovitis, Glenohumeral Chondrosis, Partial Anterior Labral Tear, Partial Posterior Labral Tear, Partial Rotator Cuff Tear  She developed hives from the Celebrex.  There is still discomfort.

## 2024-03-04 NOTE — ASSESSMENT
[FreeTextEntry1] : We discussed her options. Her endocrinologist OK'd an injection. Ice use discussed.  She will call for follow up.  Questions answered.  Patient was seen by Dr. Judd Ash. Patient was seen by Anne FLORIAN under the supervision of Dr. Judd Ash. Progress note was completed by Anne FLORIAN. Entered by Joy Hayden acting as scribe.  Procedure Name: Large Joint Injection / Aspiration: Depomedrol, Lidocaine and Guidance Ultrasound  Large Joint Injection was performed because of pain and inflammation. Depomedrol: An injection of Depomedrol 40 mg , 2 cc. Lidocaine: An injection of Lidocaine 1 mg , 13 cc.  Medication was injected in the right subacromial and glenohumeral space. Patient has tried OTC's including aspirin, Ibuprofen, Aleve etc or prescription NSAIDS, and/or exercises at home and/ or physical therapy without satisfactory response. The risks, benefits, and alternatives to steroid injection were explained in full to the patient. Risks outlined include but are not limited to infection, sepsis, bleeding, scarring, skin discoloration, temporary increase in pain, syncopal episode, failure to resolve symptoms, allergic reaction, symptom recurrence, and elevation of blood sugar in diabetics. Patient understood the risks. All questions were answered. After discussion of options, patient requested an injection. Oral informed consent was obtained.  Sterile preparation with betadine and aseptic technique was utilized for the procedure, including the preparation of the solutions used for the injection. Patient tolerated the procedure well.   Post Procedure Instructions: Patient was advised to call if redness, pain, or fever occur and apply ice for 15 min. out of every hour for the next 12-24 hours as tolerated. Patient was advised to rest the joint(s) for 3 days.  Advised to ice the injection site this evening. Ultrasound Guidance was used for the following reasons: for precise injection in area of tear. Visualization of the needle and placement of injection was performed without complication.

## 2024-06-13 NOTE — H&P PST ADULT - ANTERIOR CERVICAL L
6/13/2024 Order placed for L dx mammo/US, fitz pt regarding results and gave her [facility] number to call and make appt.   normal

## 2024-10-09 ENCOUNTER — APPOINTMENT (OUTPATIENT)
Dept: RADIOLOGY | Facility: CLINIC | Age: 49
End: 2024-10-09
Payer: COMMERCIAL

## 2024-10-09 ENCOUNTER — OUTPATIENT (OUTPATIENT)
Dept: OUTPATIENT SERVICES | Facility: HOSPITAL | Age: 49
LOS: 1 days | End: 2024-10-09
Payer: COMMERCIAL

## 2024-10-09 DIAGNOSIS — Z98.890 OTHER SPECIFIED POSTPROCEDURAL STATES: Chronic | ICD-10-CM

## 2024-10-09 DIAGNOSIS — Z98.891 HISTORY OF UTERINE SCAR FROM PREVIOUS SURGERY: Chronic | ICD-10-CM

## 2024-10-09 DIAGNOSIS — Z00.8 ENCOUNTER FOR OTHER GENERAL EXAMINATION: ICD-10-CM

## 2024-10-09 PROCEDURE — 77080 DXA BONE DENSITY AXIAL: CPT

## 2024-10-09 PROCEDURE — 77085 DXA BONE DENSITY AXL VRT FX: CPT

## 2024-10-09 PROCEDURE — 77085 DXA BONE DENSITY AXL VRT FX: CPT | Mod: 26

## (undated) DEVICE — POSITIONER PATIENT SAFETY STRAP 3X60"

## (undated) DEVICE — ELCTR WAND AMBIENT MEGA 90DRG

## (undated) DEVICE — SUT MONOCRYL 3-0 18" PS-2 UNDYED

## (undated) DEVICE — DRAPE SURGICAL #1010

## (undated) DEVICE — CANNULA S&N ARTHROSCOPY W OBTURATOR 8MM

## (undated) DEVICE — WARMING BLANKET LOWER ADULT

## (undated) DEVICE — ELCTR S&N SWITCHPEN WITH L-HOOK FOR FLUID

## (undated) DEVICE — BUR S&N STONECUTTER ELITE 4MM STRAIGHT (MAROON)

## (undated) DEVICE — TUBING DEPUY MITEK FMS INFLOW

## (undated) DEVICE — SHAVER BLADE S&N FULL RADIUS BONECUTTER PLATINUM 4.5MM (YELLOW)

## (undated) DEVICE — GLV 7.5 PROTEXIS (WHITE)

## (undated) DEVICE — DRSG STERISTRIPS 0.5 X 4"

## (undated) DEVICE — SYR LUER LOK 50CC

## (undated) DEVICE — NDL SPINAL 18G X 3.5" (PINK)

## (undated) DEVICE — SOL IRR BAG NS 0.9% 3000ML

## (undated) DEVICE — DRAPE MAYO STAND 23"

## (undated) DEVICE — CANNULA LINVATEC SHOULDER 7CM (GREY & ORANGE)

## (undated) DEVICE — CAM-ESU 7925339: Type: DURABLE MEDICAL EQUIPMENT

## (undated) DEVICE — KNIFE S&N ACUFEX BANANA SERRATED 3MM STRAIGHT

## (undated) DEVICE — DRSG TAPE MICROFOAM 4"

## (undated) DEVICE — PACK SHOULDER

## (undated) DEVICE — ELCTR ROCKER SWITCH PENCIL BLUE 10FT

## (undated) DEVICE — VENODYNE/SCD SLEEVE CALF MEDIUM

## (undated) DEVICE — SUT PROLENE 0 30" CT-1

## (undated) DEVICE — GLV 8 PROTEXIS (BLUE)

## (undated) DEVICE — TUBING DEPUY MITEK FMS OUTFLOW

## (undated) DEVICE — DRSG CURITY GAUZE SPONGE 4 X 4" 12-PLY

## (undated) DEVICE — POSITIONER S&N FACE MASK SPIDER 2